# Patient Record
Sex: FEMALE | Race: WHITE
[De-identification: names, ages, dates, MRNs, and addresses within clinical notes are randomized per-mention and may not be internally consistent; named-entity substitution may affect disease eponyms.]

---

## 2017-07-29 ENCOUNTER — HEALTH MAINTENANCE LETTER (OUTPATIENT)
Age: 47
End: 2017-07-29

## 2021-05-29 ENCOUNTER — RECORDS - HEALTHEAST (OUTPATIENT)
Dept: ADMINISTRATIVE | Facility: CLINIC | Age: 51
End: 2021-05-29

## 2025-01-20 NOTE — PROGRESS NOTES
Department of Radiation Oncology  [address] [address]  [phone]       Consultation Note    Name: Olga Dawkins MRN: [id number]   : 1970   Date of Service: 2025  Referring: Dr. Julieta Rock      Diagnosis: Metastatic endometrioid carcinoma of the ovary  Stage: IV    History of Present Illness   Ms. 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 She has completed multiple lines of systemic therapy as well as ablation of a right diaphragmatic/cardiophrenic metastasis. This lesion has become more painful and she was referred to our clinic to discuss palliative radiation to the right anterior chest wall metastasis.     Ms. Vasquez initially presented in 2022 and was diagnosed with Stage IIIB, Grade 2 endometrioid carcinoma with mucinous differentiation. She underwent simple hysterectomy, left salpingo-oophorectomy, omentectomy, pelvic peritoneum excision, bladder serosa excision, right IP excision, and small bowel mesentery excision in 2022 with pathology notable for a 23.5 x 17.8 x 16.6 cm tumor of the left ovary and involvement of the pelvic peritoneum, omentum, bladder serosa and small bowel mesentery.     She initially completed 6 cycles of adjuvant carboplatin and paclitaxel followed by daily Zejula. In late , she had a localized 3.5 x 3.3 cm mixed density lesion superior to the diaphragm. Coupled with a rising CEA, this was a suspected metastasis, but biopsy was not pursued. She completed image guided ablation of the right diaphragmatic metastasis on 2024.     Post procedure, she experienced chronic pleural effusions and dyspnea. Subsequent scans were notable for prominent lymph nodes in the right upper abdomen, left adrenal  nodularity, and a borderline left retroperitoneal lymph node. There was a continued soft thickening along the right hemidiaphragm and right pleura in the area of prior ablation.     She presented to the ED multiple times due to right chest pain. MRI of the chest performed on 12/31/24 showed a stable 3.2 x 2.8 cm nodule at the level of the anterior right fifth-sixth costal cartilage and enlarging posterior mediastinal and right internal mammary nodes.     Given continued pain in the right chest wall, referral was placed for consideration of palliative radiation.       Currently, the patient reports 7/10 pain in the right chest wall that radiated posteriorly for which she is using advil and oxycodone for pain control. She feels the pain is close to the area of prior ablation but not necessarily in the same location. Pain began last July and has gotten progressively worse over the last several weeks. It is exacerbated by activity and lifting.       CHEMOTHERAPY HISTORY: As Above   PACEMAKER: Denies  PREGNANCY STATUS: N/A  PAST RADIATION THERAPY HISTORY: Denies    PAST MEDICAL HISTORY:  Past Medical History:   Diagnosis Date    Anemia     Endometrioid carcinoma metastatic to ovary (H)     tumor of the L ovary and involvment of pelvic periotoneum, omentum, bladder serosa and small bowel mesentery    Prediabetes        PAST SURGICAL HISTORY:  Past Surgical History:   Procedure Laterality Date    EP ABLATION  01/12/2024    Pt had ablation of R diaphragmatic metastasis    HYSTERECTOMY  06/2022    In 6/2022 Pt had a simple hysterectomy, left salpingo-oophorectomy, omentectomy, pelvic peritoneum excision, bladder serosa excision, right IP excision, and small bowel mesentery excision       ALLERGIES:  Allergies as of 01/22/2025    (No Known Allergies)       MEDICATIONS:  Current Outpatient Medications   Medication Sig Dispense Refill    albuterol (PROAIR HFA, PROVENTIL HFA, VENTOLIN HFA) 108 (90 BASE) MCG/ACT inhaler Inhale 2  puffs into the lungs every 6 hours 18 g 2    FLUoxetine (PROZAC) 20 MG capsule Take 3 capsules (60 mg) by mouth daily 90 capsule 2    fluticasone (FLONASE) 50 MCG/ACT nasal spray Spray 2 sprays into both nostrils daily 16 g 2    traZODone (DESYREL) 100 MG tablet Take 1 tablet (100 mg) by mouth At Bedtime 30 tablet 1    dicyclomine (BENTYL) 10 MG capsule Take 1 capsule (10 mg) by mouth 4 times daily (before meals and nightly) (Patient not taking: Reported on 2025) 240 capsule 2    montelukast (SINGULAIR) 4 MG chewable tablet Take 1 tablet (4 mg) by mouth At Bedtime (Patient not taking: Reported on 2025) 30 tablet 2        FAMILY HISTORY:  No family history on file.    SOCIAL HISTORY:  Social History     Socioeconomic History    Marital status: Single     Spouse name: Not on file    Number of children: Not on file    Years of education: Not on file    Highest education level: Not on file   Occupational History    Not on file   Tobacco Use    Smoking status: Former     Current packs/day: 0.00     Types: Cigarettes     Quit date: 3/4/2016     Years since quittin.8    Smokeless tobacco: Never   Substance and Sexual Activity    Alcohol use: No     Alcohol/week: 0.0 standard drinks of alcohol    Drug use: No    Sexual activity: Not on file   Other Topics Concern    Not on file   Social History Narrative    Not on file     Social Drivers of Health     Financial Resource Strain: Not At Risk (2023)    Received from [facility]    Financial Resource Strain     Is it hard for you to pay for the very basics like food, housing, medical care or heating?: No   Food Insecurity: No Food Insecurity (2025)    Received from [facility]    Hunger Vital Sign     Worried About Running Out of Food in the Last Year: Never true     Ran Out of Food in the Last Year: Never true   Transportation Needs: No Transportation Needs (2025)    Received from [facility]    PRAPARE - Transportation     Lack of  Transportation (Medical): No     Lack of Transportation (Non-Medical): No   Physical Activity: Sufficiently Active (1/14/2025)    Received from [facility]    Exercise Vital Sign     Days of Exercise per Week: 7 days     Minutes of Exercise per Session: 40 min   Stress: Not on file   Social Connections: Unknown (8/13/2024)    Received from [facility]    Social Connections     Frequency of Communication with Friends and Family: Not asked     Frequency of Social Gatherings with Friends and Family: Not asked   Interpersonal Safety: Not At Risk (9/30/2024)    [facility]    Humiliation, Afraid, Rape, and Kick questionnaire     Fear of Current or Ex-Partner: No     Emotionally Abused: No     Physically Abused: No     Sexually Abused: No   Housing Stability: Low Risk  (1/14/2025)    Received from [facility]    Housing Stability     What is your living situation today?: I have a steady place to live         Review of Systems   A 12-point review of systems was performed. Pertinent findings are noted in the HPI.    Physical Exam   ECOG Status: 1    Due to the nature of video visit, physical exam is limited.     VITALS: There were no vitals taken for this visit.  GEN: Appears well, alert, oriented, and in NAD  HEENT: EOMI  PSYCH: Appropriate mood and affect    Imaging/Path/Labs   Imaging: per hpi     Path: per hpi     Labs: reviewed    Assessment    Ms. 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 She has completed multiple lines of systemic therapy as well as ablation of a right diaphragmatic/cardiophrenic metastasis. This lesion has become more painful and she was referred to our clinic to discuss palliative radiation to the right anterior chest wall metastasis.     Plan   We had a detailed discussion with Ms. Dawkins  regarding her disease course and relevant management options as they pertain to radiation. In summary, the patient has had a right anterior residual chest wall/pleural bases metastasis that was treated with ablation 1 year ago. While the pleural based disease partially responded to ablation, it appears the chest wall aspect of the disease has shown interval growth over recent CT scans with possible evidence of invasion of the rib/costal cartilage.     She has reported significant pain along the anterior right chest wall consistent with this area of residual metastasis. We therefore recommend palliative radiation to this lesion with the primary goal of reducing pain. Treatment will likely be delivered over 5 daily fractions and she has CT simulation planned for 1/23/35 with urgent treatment beginning the same day in order to complete RT prior to her next chemo infusion.     Risks and benefits of palliative RT to the chest wall were discussed in detail with Ms. Dawkins. She expressed an understanding of these risks an wishes to proceed with treatment.     Greater than 30 minutes were spent on video visit counseling the patient. Virtual consent was obtained for treatment.     Patient was seen and discussed with Dr. Ruano.    Arley Ritchie MD  PGY-2  Radiation Oncology    Physician Attestation   MARLIN, Janie Ruano, saw this patient and agree with the findings and plan of care as documented in the note.   I was present for key portions of the history and physical exam.  Briefly, Ms. Dawkins is a 54-year-old woman with a history of metastatic endometrioid carcinoma with mucinous differentiation fo the fallopian tube, initially presenting with FIGO  stage IIIB (pT3b, pNX, cM0), status post simple hysterectomy, left salpingo-oophorectomy, omentectomy, pelvic peritoneum excision, bladder serosa excision, right IP excision, and small bowel mesentery excision on 6/23/22 with pathology notable for a 23.5 cm tumor of the  left ovary and involvement of the pelvic peritoneum, omentum, bladder serosa and small bowel mesentery.  She was found to have recurrent/metastatic disease confirmed on CT from 12/20/2023 with an anterior right diaphragmatic lesion growing from prior imaging.  She has subsequently completed multiple courses of systemic therapy, most recently and presently on carboplatin and paclitaxel, started 12/19/2024.  She is seen in consultation regarding an increasingly painful anterior right chest wall metastasis measuring 3.2 cm at the level of the anterior right fifth to 6 costal cartilage, in the vicinity of prior ablation done in January 2024.  Other findings on recent imaging include increasing adenopathy in the mediastinum and internal mammary regions, upper abdomen, and retroperitoneal areas, and interval increase in the nodule in the left upper quadrant of the abdomen, and stable soft tissue density at the left renal hilum.    We recommended palliative radiation to the right anterior chest wall metastasis.  We explained the palliative, symptom-focused intention of such therapy.  We noted that the goal was to reduce pain attributable to cancer activity at the bone site.  We specifically noted that such therapy is targeted to a specific area of the body and is not felt to be effective in impacting patient survival time.      We discussed that doses used for palliative radiation therapy are generally not ablative.  This is because out of the curative context, we are balancing a dose capable of reducing or preventing symptoms with minimizing incidental irradiation of adjacent normal structures that may result in morbidity.  Moreover, we use foreshortened courses to reduce the overall treatment duration to optimize quality of life and avoid delaying systemic therapy, other interventions, or transitions in care such as initiation of hospice when applicable.      We discussed the risks (short and long term as listed on  consent), benefits, and alternatives to radiotherapy.  The risks of radiation include but are not limited to: pain flare, skin irritation, hair loss in the area treated, fibrosis, joint stiffness, edema, wound healing difficulties, increased risk of fracture, damage to nerves, and tumors caused by radiation.  We provided educational material regarding self care of the most common side effects.     She expressed clear understanding of the palliative intent of therapy. Ms. Dawkins is in agreement with this plan and will be scheduled for CT-based simulation for radiation planning.     Plan:  1) Palliative radiotherapy over 5 fractions, 3D CRT, CBCT on day 1 only.  2) Simulation will be performed in supine, arms at side, free breathing.  3) In order to permit her to start chemotherapy as planned next Thursday, we will do a SIM and treat tomorrow as an outpatient.      We appreciate the opportunity to participate in Ms. Dawkins's care. She was encouraged to contact me with questions or concerns should they arise.    I personally reviewed the available CT and MRI images. Laboratory data and pathology as noted above was reviewed. I reviewed previous medical records, which are summarized in the HPI. A total of 60 minutes were spent (including face to face time) during this visit, and more than 50% of the time was spent in counseling and coordination of care.     Janie Ruano MD MPH PhD    Department of Radiation Oncology

## 2025-01-20 NOTE — TELEPHONE ENCOUNTER
MEDICAL RECORDS REQUEST   Radiation Oncology  [address] [place] [address]  Fax: [phone]          FUTURE VISIT INFORMATION                                                   Olga Dawkins, : 1970 scheduled for future visit at [facility] Radiation Oncology    RECORDS REQUESTED FOR VISIT                                                     Imaging Received  2025 9:52 AM    Action: Images from  received and resolved to PACS.     Action 2025 12:57 PM    Action Taken Received call from [facility], they can't send disc. They're unable to find us in [facility] and they don't send images through PACS.     Imaging Received  2025 8:05 AM    Action: Images from [facility] received and resolved to PACS.     Imaging DISC Received  2025 1:42 PM    Action: Imaging disc from [facility] received and taken to [facility] for upload.     GYNECOLOGY     OFFICE NOTE from PCP -  24: KIRA West CNP   OFFICE NOTE from OB/GYN [facility] 22: Dr. Shanice Noriega   OFFICE NOTE from gyn onc [facility] 25: Dr. Dwayne Hill   OPERATIVE/BIOPSY REPORTS (include exam under anes) CE- [facility]/ CE- [facility]:  22: Hysterectomy    [facility]:  18: LAPAROSCOPY; SALPINGO-OOPHORECTOMY    CHEMOTHERAPY NOTES/LOG [facility] Most recent 25   [facility] L[facility]    LABS     PATHOLOGY REPORTS Report in [facility]/ CE- [facility]:  22: [id number]     [facility]:  18: [id number]    ANYTHING RELATED TO DIAGNOSIS [facility]- Most recent 24   IMAGING (NEED IMAGES & REPORT)  [facility]: [id number]   CT SCANS PACS/ (img disc [facility])   [facility]:  24, 24, 3/27/24, 24, 24, 23: CT Chest  24, 24, 3/27/24, 23: CT Abd Pel    [facility]:  24: CTA Chest    [facility]:  22: CT Abd Pel   MRI PACS [facility]:  24: MR Chest    [facility]:  24: MR Chest   XRAYS PACS [facility]:  24: XR Chest   ULTRASOUND PACS/ (img  disc [facility]) [facility]:  5/26/22: US Transvaginal    :  6/15/18: US Pel

## 2025-01-22 ENCOUNTER — VIRTUAL VISIT (OUTPATIENT)
Dept: RADIATION ONCOLOGY | Facility: CLINIC | Age: 55
End: 2025-01-22
Attending: RADIOLOGY
Payer: COMMERCIAL

## 2025-01-22 ENCOUNTER — PRE VISIT (OUTPATIENT)
Dept: RADIATION ONCOLOGY | Facility: CLINIC | Age: 55
End: 2025-01-22
Payer: COMMERCIAL

## 2025-01-22 DIAGNOSIS — C79.51 MALIGNANT NEOPLASM METASTATIC TO BONE (H): Primary | ICD-10-CM

## 2025-01-22 PROCEDURE — 98003 SYNCH AUDIO-VIDEO NEW HI 60: CPT | Mod: GC | Performed by: RADIOLOGY

## 2025-01-22 ASSESSMENT — PAIN SCALES - GENERAL: PAINLEVEL_OUTOF10: SEVERE PAIN (7)

## 2025-01-22 NOTE — PROGRESS NOTES
Oncology Rooming Note    January 22, 2025 9:20 AM   Olga Dawkins is a 54 year old female who presents for:    Chief Complaint   Patient presents with    Cancer     Consult   Metastatic endometrioid carcinoma of the ovary    Initial Vitals: There were no vitals taken for this visit. There is no height or weight on file to calculate BMI. There is no height or weight on file to calculate BSA.  Data Unavailable Comment: Data Unavailable   No LMP recorded.  Allergies reviewed: Yes  Medications reviewed: Yes    Medications: Medication refills not needed today.  Pharmacy name entered into EPIC: [facility] - [place], [place] - [address]    Frailty Screening:   Is the patient here for a new oncology consult visit in cancer care? 1. Yes. Over the past month, have you experienced difficulty or required a caregiver to assist with:   1. Balance, walking or general mobility (including any falls)? NO  2. Completion of self-care tasks such as bathing, dressing, toileting, grooming/hygiene?  NO  3. Concentration or memory that affects your daily life?  NO     Considerations for radiation treatment   Pregnancy status: Female with hysterectomy   Implanted Cardiac Devices: No   Any previous radiation therapy: No    Clinical concerns: Pt is here to discuss palliative radiation to a R anterior chest wall metastasis that has become more painful. Pt states the pain as sharp and 7/10 for severity.  The pain also radiates to her back intermittently.  Her pain regimen consists of taking five Advil 200 mg tablets at a time approximately every 6 hours.  She is also infrequently taking oxycodone 10 mg at  bedtime.   Dr. Ritchie was notified.    Virtual Visit Details    Type of service:  Video Visit     Originating Location (pt. Location): Home    Distant Location (provider location):  On-site  Platform used for Video Visit: Maikel Dobbs, BRANDON

## 2025-01-22 NOTE — LETTER
2025         RE: Olga Dawkins  [address] [facility] [address]         Department of Radiation Oncology  [facility]  [address] [place] [address]  [phone]       Consultation Note    Name: Olga Dawkins MRN: [id number]   : 1970   Date of Service: 2025  Referring: Dr. Julieta Rock      Diagnosis: Metastatic endometrioid carcinoma of the ovary  Stage: IV    History of Present Illness   Ms. 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 She has completed multiple lines of systemic therapy as well as ablation of a right diaphragmatic/cardiophrenic metastasis. This lesion has become more painful and she was referred to our clinic to discuss palliative radiation to the right anterior chest wall metastasis.     Ms. Vasquez initially presented in 2022 and was diagnosed with Stage IIIB, Grade 2 endometrioid carcinoma with mucinous differentiation. She underwent simple hysterectomy, left salpingo-oophorectomy, omentectomy, pelvic peritoneum excision, bladder serosa excision, right IP excision, and small bowel mesentery excision in 2022 with pathology notable for a 23.5 x 17.8 x 16.6 cm tumor of the left ovary and involvement of the pelvic peritoneum, omentum, bladder serosa and small bowel mesentery.     She initially completed 6 cycles of adjuvant carboplatin and paclitaxel followed by daily Zejula. In late , she had a localized 3.5 x 3.3 cm mixed density lesion superior to the diaphragm. Coupled with a rising CEA, this was a suspected metastasis, but biopsy was not pursued. She completed image guided ablation of the right diaphragmatic metastasis on 2024.     Post procedure, she experienced chronic pleural effusions and dyspnea.  Subsequent scans were notable for prominent lymph nodes in the right upper abdomen, left adrenal nodularity, and a borderline left retroperitoneal lymph node. There was a continued soft thickening along the right hemidiaphragm and right pleura in the area of prior ablation.     She presented to the ED multiple times due to right chest pain. MRI of the chest performed on 12/31/24 showed a stable 3.2 x 2.8 cm nodule at the level of the anterior right fifth-sixth costal cartilage and enlarging posterior mediastinal and right internal mammary nodes.     Given continued pain in the right chest wall, referral was placed for consideration of palliative radiation.       Currently, the patient reports 7/10 pain in the right chest wall that radiated posteriorly for which she is using advil and oxycodone for pain control. She feels the pain is close to the area of prior ablation but not necessarily in the same location. Pain began last July and has gotten progressively worse over the last several weeks. It is exacerbated by activity and lifting.       CHEMOTHERAPY HISTORY: As Above   PACEMAKER: Denies  PREGNANCY STATUS: N/A  PAST RADIATION THERAPY HISTORY: Denies    PAST MEDICAL HISTORY:  Past Medical History:   Diagnosis Date     Anemia      Endometrioid carcinoma metastatic to ovary (H)     tumor of the L ovary and involvment of pelvic periotoneum, omentum, bladder serosa and small bowel mesentery     Prediabetes        PAST SURGICAL HISTORY:  Past Surgical History:   Procedure Laterality Date     EP ABLATION  01/12/2024    Pt had ablation of R diaphragmatic metastasis     HYSTERECTOMY  06/2022    In 6/2022 Pt had a simple hysterectomy, left salpingo-oophorectomy, omentectomy, pelvic peritoneum excision, bladder serosa excision, right IP excision, and small bowel mesentery excision       ALLERGIES:  Allergies as of 01/22/2025     (No Known Allergies)       MEDICATIONS:  Current Outpatient Medications   Medication Sig  Dispense Refill     albuterol (PROAIR HFA, PROVENTIL HFA, VENTOLIN HFA) 108 (90 BASE) MCG/ACT inhaler Inhale 2 puffs into the lungs every 6 hours 18 g 2     FLUoxetine (PROZAC) 20 MG capsule Take 3 capsules (60 mg) by mouth daily 90 capsule 2     fluticasone (FLONASE) 50 MCG/ACT nasal spray Spray 2 sprays into both nostrils daily 16 g 2     traZODone (DESYREL) 100 MG tablet Take 1 tablet (100 mg) by mouth At Bedtime 30 tablet 1     dicyclomine (BENTYL) 10 MG capsule Take 1 capsule (10 mg) by mouth 4 times daily (before meals and nightly) (Patient not taking: Reported on 2025) 240 capsule 2     montelukast (SINGULAIR) 4 MG chewable tablet Take 1 tablet (4 mg) by mouth At Bedtime (Patient not taking: Reported on 2025) 30 tablet 2        FAMILY HISTORY:  No family history on file.    SOCIAL HISTORY:  Social History     Socioeconomic History     Marital status: Single     Spouse name: Not on file     Number of children: Not on file     Years of education: Not on file     Highest education level: Not on file   Occupational History     Not on file   Tobacco Use     Smoking status: Former     Current packs/day: 0.00     Types: Cigarettes     Quit date: 3/4/2016     Years since quittin.8     Smokeless tobacco: Never   Substance and Sexual Activity     Alcohol use: No     Alcohol/week: 0.0 standard drinks of alcohol     Drug use: No     Sexual activity: Not on file   Other Topics Concern     Not on file   Social History Narrative     Not on file     Social Drivers of Health     Financial Resource Strain: Not At Risk (2023)    Received from [facility]    Financial Resource Strain      Is it hard for you to pay for the very basics like food, housing, medical care or heating?: No   Food Insecurity: No Food Insecurity (2025)    Received from [facility]    Hunger Vital Sign      Worried About Running Out of Food in the Last Year: Never true      Ran Out of Food in the Last Year: Never true    Transportation Needs: No Transportation Needs (1/14/2025)    Received from [facility]    PRAPARE - Transportation      Lack of Transportation (Medical): No      Lack of Transportation (Non-Medical): No   Physical Activity: Sufficiently Active (1/14/2025)    Received from [facility]    Exercise Vital Sign      Days of Exercise per Week: 7 days      Minutes of Exercise per Session: 40 min   Stress: Not on file   Social Connections: Unknown (8/13/2024)    Received from [email or web address]    Social Connections      Frequency of Communication with Friends and Family: Not asked      Frequency of Social Gatherings with Friends and Family: Not asked   Interpersonal Safety: Not At Risk (9/30/2024)    [facility]    Humiliation, Afraid, Rape, and Kick questionnaire      Fear of Current or Ex-Partner: No      Emotionally Abused: No      Physically Abused: No      Sexually Abused: No   Housing Stability: Low Risk  (1/14/2025)    Received from [facility]    Housing Stability      What is your living situation today?: I have a steady place to live         Review of Systems   A 12-point review of systems was performed. Pertinent findings are noted in the HPI.    Physical Exam   ECOG Status: 1    Due to the nature of video visit, physical exam is limited.     VITALS: There were no vitals taken for this visit.  GEN: Appears well, alert, oriented, and in NAD  HEENT: EOMI  PSYCH: Appropriate mood and affect    Imaging/Path/Labs   Imaging: per hpi     Path: per hpi     Labs: reviewed    Assessment    Ms. 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 She has completed multiple lines of systemic therapy as well as ablation of a right diaphragmatic/cardiophrenic metastasis. This lesion has become more painful and she was referred to our  clinic to discuss palliative radiation to the right anterior chest wall metastasis.     Plan   We had a detailed discussion with Ms. Dawkins regarding her disease course and relevant management options as they pertain to radiation. In summary, the patient has had a right anterior residual chest wall/pleural bases metastasis that was treated with ablation 1 year ago. While the pleural based disease partially responded to ablation, it appears the chest wall aspect of the disease has shown interval growth over recent CT scans with possible evidence of invasion of the rib/costal cartilage.     She has reported significant pain along the anterior right chest wall consistent with this area of residual metastasis. We therefore recommend palliative radiation to this lesion with the primary goal of reducing pain. Treatment will likely be delivered over 5 daily fractions and she has CT simulation planned for 1/23/35 with urgent treatment beginning the same day in order to complete RT prior to her next chemo infusion.     Risks and benefits of palliative RT to the chest wall were discussed in detail with Ms. Dawkins. She expressed an understanding of these risks an wishes to proceed with treatment.     Greater than 30 minutes were spent on video visit counseling the patient. Virtual consent was obtained for treatment.     Patient was seen and discussed with Dr. Ruano.    Arley Ritchie MD  PGY-2  Radiation Oncology    Physician Attestation   MARLIN, Janie Ruano, saw this patient and agree with the findings and plan of care as documented in the note.   I was present for key portions of the history and physical exam.  Briefly, Ms. Dawkins is a 54-year-old woman with a history of metastatic endometrioid carcinoma with mucinous differentiation fo the fallopian tube, initially presenting with FIGO  stage IIIB (pT3b, pNX, cM0), status post simple hysterectomy, left salpingo-oophorectomy, omentectomy, pelvic peritoneum  excision, bladder serosa excision, right IP excision, and small bowel mesentery excision on 6/23/22 with pathology notable for a 23.5 cm tumor of the left ovary and involvement of the pelvic peritoneum, omentum, bladder serosa and small bowel mesentery.  She was found to have recurrent/metastatic disease confirmed on CT from 12/20/2023 with an anterior right diaphragmatic lesion growing from prior imaging.  She has subsequently completed multiple courses of systemic therapy, most recently and presently on carboplatin and paclitaxel, started 12/19/2024.  She is seen in consultation regarding an increasingly painful anterior right chest wall metastasis measuring 3.2 cm at the level of the anterior right fifth to 6 costal cartilage, in the vicinity of prior ablation done in January 2024.  Other findings on recent imaging include increasing adenopathy in the mediastinum and internal mammary regions, upper abdomen, and retroperitoneal areas, and interval increase in the nodule in the left upper quadrant of the abdomen, and stable soft tissue density at the left renal hilum.    We recommended palliative radiation to the right anterior chest wall metastasis.  We explained the palliative, symptom-focused intention of such therapy.  We noted that the goal was to reduce pain attributable to cancer activity at the bone site.  We specifically noted that such therapy is targeted to a specific area of the body and is not felt to be effective in impacting patient survival time.      We discussed that doses used for palliative radiation therapy are generally not ablative.  This is because out of the curative context, we are balancing a dose capable of reducing or preventing symptoms with minimizing incidental irradiation of adjacent normal structures that may result in morbidity.  Moreover, we use foreshortened courses to reduce the overall treatment duration to optimize quality of life and avoid delaying systemic therapy, other  interventions, or transitions in care such as initiation of hospice when applicable.      We discussed the risks (short and long term as listed on consent), benefits, and alternatives to radiotherapy.  The risks of radiation include but are not limited to: pain flare, skin irritation, hair loss in the area treated, fibrosis, joint stiffness, edema, wound healing difficulties, increased risk of fracture, damage to nerves, and tumors caused by radiation.  We provided educational material regarding self care of the most common side effects.     She expressed clear understanding of the palliative intent of therapy. Ms. Dawkins is in agreement with this plan and will be scheduled for CT-based simulation for radiation planning.     Plan:  1) Palliative radiotherapy over 5 fractions, 3D CRT, CBCT on day 1 only.  2) Simulation will be performed in supine, arms at side, free breathing.  3) In order to permit her to start chemotherapy as planned next Thursday, we will do a SIM and treat tomorrow as an outpatient.      We appreciate the opportunity to participate in Ms. Dawkins's care. She was encouraged to contact me with questions or concerns should they arise.    I personally reviewed the available CT and MRI images. Laboratory data and pathology as noted above was reviewed. I reviewed previous medical records, which are summarized in the HPI. A total of 60 minutes were spent (including face to face time) during this visit, and more than 50% of the time was spent in counseling and coordination of care.     Janie Ruano MD MPH PhD    Department of Radiation Oncology         Oncology Rooming Note    January 22, 2025 9:20 AM   Olga Dawkins is a 54 year old female who presents for:    Chief Complaint   Patient presents with     Cancer     Consult   Metastatic endometrioid carcinoma of the ovary    Initial Vitals: There were no vitals taken for this visit. There is no height or weight on file  to calculate BMI. There is no height or weight on file to calculate BSA.  Data Unavailable Comment: Data Unavailable   No LMP recorded.  Allergies reviewed: Yes  Medications reviewed: Yes    Medications: Medication refills not needed today.  Pharmacy name entered into EPIC: [facility] - [place], [place] - [address]     Frailty Screening:   Is the patient here for a new oncology consult visit in cancer care? 1. Yes. Over the past month, have you experienced difficulty or required a caregiver to assist with:   1. Balance, walking or general mobility (including any falls)? NO  2. Completion of self-care tasks such as bathing, dressing, toileting, grooming/hygiene?  NO  3. Concentration or memory that affects your daily life?  NO     Considerations for radiation treatment   Pregnancy status: Female with hysterectomy   Implanted Cardiac Devices: No   Any previous radiation therapy: No    Clinical concerns: Pt is here to discuss palliative radiation to a R anterior chest wall metastasis that has become more painful. Pt states the pain as sharp and 7/10 for severity.  The pain also radiates to her back intermittently.  Her pain regimen consists of taking five Advil 200 mg tablets at a time approximately every 6 hours.  She is also infrequently taking oxycodone 10 mg at  bedtime.   Dr. Ritchie was notified.    Virtual Visit Details    Type of service:  Video Visit     Originating Location (pt. Location): Home    Distant Location (provider location):  On-site  Platform used for Video Visit: BRANDON Sanchez

## 2025-01-23 ENCOUNTER — OFFICE VISIT (OUTPATIENT)
Dept: RADIATION ONCOLOGY | Facility: CLINIC | Age: 55
End: 2025-01-23
Attending: RADIOLOGY
Payer: COMMERCIAL

## 2025-01-23 DIAGNOSIS — C79.51 MALIGNANT NEOPLASM METASTATIC TO BONE (H): Primary | ICD-10-CM

## 2025-01-23 NOTE — LETTER
1/23/2025      Olga Dawkins  [address]   [place] [address]      Dear Colleague,    Thank you for referring your patient, Olga Dawkins, to the [facility] RADIATION ONCOLOGY. Please see a copy of my visit note below.    A radiation therapy treatment planning simulation was performed.  Please see the [facility] record for documentation.    iWllow Bravo MD  Radiation Oncology       Again, thank you for allowing me to participate in the care of your patient.        Sincerely,        Willow Bravo MD    Electronically signed

## 2025-01-23 NOTE — LETTER
1/23/2025      Olga Dawkins  [address] [facility] [place]  [place] [address]      Dear Colleague,    Thank you for referring your patient, Olga Dawkins, to the [facility] RADIATION ONCOLOGY. Please see a copy of my visit note below.    Treatment set up verification      Again, thank you for allowing me to participate in the care of your patient.        Sincerely,        Willow Bravo MD    Electronically signed

## 2025-01-23 NOTE — PROGRESS NOTES
A radiation therapy treatment planning simulation was performed.  Please see the [facility] record for documentation.    Willow Bravo MD  Radiation Oncology

## 2025-01-24 ENCOUNTER — APPOINTMENT (OUTPATIENT)
Dept: RADIATION ONCOLOGY | Facility: CLINIC | Age: 55
End: 2025-01-24
Attending: RADIOLOGY
Payer: COMMERCIAL

## 2025-01-24 PROCEDURE — 77412 RADIATION TX DELIVERY LVL 3: CPT | Performed by: RADIOLOGY

## 2025-01-27 ENCOUNTER — APPOINTMENT (OUTPATIENT)
Dept: RADIATION ONCOLOGY | Facility: CLINIC | Age: 55
End: 2025-01-27
Attending: RADIOLOGY
Payer: COMMERCIAL

## 2025-01-27 DIAGNOSIS — C79.51 MALIGNANT NEOPLASM METASTATIC TO BONE (H): ICD-10-CM

## 2025-01-27 DIAGNOSIS — G89.3 CANCER RELATED PAIN: ICD-10-CM

## 2025-01-27 PROCEDURE — 77412 RADIATION TX DELIVERY LVL 3: CPT | Performed by: RADIOLOGY

## 2025-01-27 RX ORDER — OXYCODONE HYDROCHLORIDE 5 MG/1
5 TABLET ORAL EVERY 6 HOURS PRN
Qty: 20 TABLET | Refills: 0 | Status: SHIPPED | OUTPATIENT
Start: 2025-01-27

## 2025-01-27 RX ORDER — OXYCODONE HYDROCHLORIDE 5 MG/1
5 TABLET ORAL EVERY 6 HOURS PRN
Qty: 20 TABLET | Refills: 0 | Status: CANCELLED | OUTPATIENT
Start: 2025-01-27

## 2025-01-28 ENCOUNTER — APPOINTMENT (OUTPATIENT)
Dept: RADIATION ONCOLOGY | Facility: CLINIC | Age: 55
End: 2025-01-28
Attending: RADIOLOGY
Payer: COMMERCIAL

## 2025-01-28 PROCEDURE — 77412 RADIATION TX DELIVERY LVL 3: CPT | Performed by: RADIOLOGY

## 2025-01-29 ENCOUNTER — APPOINTMENT (OUTPATIENT)
Dept: RADIATION ONCOLOGY | Facility: CLINIC | Age: 55
End: 2025-01-29
Attending: RADIOLOGY
Payer: COMMERCIAL

## 2025-01-29 PROCEDURE — 77336 RADIATION PHYSICS CONSULT: CPT | Performed by: RADIOLOGY

## 2025-01-29 PROCEDURE — 99207 PR NO BILLABLE SERVICE THIS VISIT: CPT | Performed by: RADIOLOGY

## 2025-01-29 PROCEDURE — 77412 RADIATION TX DELIVERY LVL 3: CPT | Performed by: RADIOLOGY

## 2025-02-02 ENCOUNTER — HEALTH MAINTENANCE LETTER (OUTPATIENT)
Age: 55
End: 2025-02-02